# Patient Record
Sex: MALE
[De-identification: names, ages, dates, MRNs, and addresses within clinical notes are randomized per-mention and may not be internally consistent; named-entity substitution may affect disease eponyms.]

---

## 2019-05-01 PROBLEM — Z00.00 ENCOUNTER FOR PREVENTIVE HEALTH EXAMINATION: Status: ACTIVE | Noted: 2019-05-01

## 2019-05-02 ENCOUNTER — APPOINTMENT (OUTPATIENT)
Dept: PHYSICAL MEDICINE AND REHAB | Facility: CLINIC | Age: 64
End: 2019-05-02
Payer: COMMERCIAL

## 2019-05-02 VITALS — HEIGHT: 74 IN | WEIGHT: 240 LBS | BODY MASS INDEX: 30.8 KG/M2

## 2019-05-02 DIAGNOSIS — Z86.39 PERSONAL HISTORY OF OTHER ENDOCRINE, NUTRITIONAL AND METABOLIC DISEASE: ICD-10-CM

## 2019-05-02 DIAGNOSIS — Z80.9 FAMILY HISTORY OF MALIGNANT NEOPLASM, UNSPECIFIED: ICD-10-CM

## 2019-05-02 DIAGNOSIS — M79.18 MYALGIA, OTHER SITE: ICD-10-CM

## 2019-05-02 DIAGNOSIS — M50.20 OTHER CERVICAL DISC DISPLACEMENT, UNSPECIFIED CERVICAL REGION: ICD-10-CM

## 2019-05-02 DIAGNOSIS — Z87.39 PERSONAL HISTORY OF OTHER DISEASES OF THE MUSCULOSKELETAL SYSTEM AND CONNECTIVE TISSUE: ICD-10-CM

## 2019-05-02 DIAGNOSIS — M47.812 SPONDYLOSIS W/OUT MYELOPATHY OR RADICULOPATHY, CERVICAL REGION: ICD-10-CM

## 2019-05-02 DIAGNOSIS — Z86.79 PERSONAL HISTORY OF OTHER DISEASES OF THE CIRCULATORY SYSTEM: ICD-10-CM

## 2019-05-02 PROCEDURE — 99204 OFFICE O/P NEW MOD 45 MIN: CPT | Mod: 25

## 2019-05-02 PROCEDURE — 20552 NJX 1/MLT TRIGGER POINT 1/2: CPT

## 2019-05-02 RX ORDER — SIMVASTATIN 40 MG/1
TABLET, FILM COATED ORAL
Refills: 0 | Status: ACTIVE | COMMUNITY

## 2019-05-02 RX ORDER — ACETAMINOPHEN AND CODEINE PHOSPHATE 300; 30 MG/1; MG/1
300-30 TABLET ORAL
Refills: 0 | Status: ACTIVE | COMMUNITY

## 2019-05-02 RX ORDER — MORPHINE SULFATE 20 MG/1
20 CAPSULE, EXTENDED RELEASE ORAL
Refills: 0 | Status: ACTIVE | COMMUNITY

## 2019-05-02 RX ORDER — MELOXICAM 7.5 MG/1
7.5 TABLET ORAL TWICE DAILY
Qty: 60 | Refills: 0 | Status: ACTIVE | COMMUNITY
Start: 2019-05-02 | End: 1900-01-01

## 2019-05-02 RX ORDER — BUPROPION HYDROCHLORIDE 450 MG/1
450 TABLET, FILM COATED, EXTENDED RELEASE ORAL
Refills: 0 | Status: ACTIVE | COMMUNITY

## 2019-05-02 RX ORDER — SERTRALINE 25 MG/1
TABLET, FILM COATED ORAL
Refills: 0 | Status: ACTIVE | COMMUNITY

## 2019-05-02 RX ORDER — TOPIRAMATE 25 MG
CAPSULE, SPRINKLE ORAL
Refills: 0 | Status: ACTIVE | COMMUNITY

## 2019-05-02 RX ORDER — LEVOTHYROXINE SODIUM 0.17 MG/1
TABLET ORAL
Refills: 0 | Status: ACTIVE | COMMUNITY

## 2019-05-02 NOTE — ASSESSMENT
[FreeTextEntry1] : Will get records from [facility] and Dr Ambriz.  Get new EMG with Dr Ambriz. Just got narcotics refills this wk. \par \par Improve posture.  Consider MBB. \par \par Check with psychiatrist if can take Gabapentin or Lyrica.

## 2019-05-02 NOTE — PHYSICAL EXAM
[FreeTextEntry1] : JER is a 64 year  yr old male \par \par Constitutional: healthy appearing, NAD, and normal body habitus\par \par NECK\par ROM: flexion to 30, ext to 30, rotation to 60 deg on left and 70 on right\par \par Inspection: no erythema, warmth\par Spine: no TTP in spinous process\par Soft tissue palpation: no TTP in cervical paraspinals, rhomboids, TTP in left trapezius\par \par 5/5 bilateral elb flex/ext, WE, finger abd/flex\par sensation intact in bilat UE\par reflexes:  biceps and triceps 0 bilat\par \par Special tests: neg Spurling, Suero\par \par

## 2019-05-02 NOTE — HISTORY OF PRESENT ILLNESS
[FreeTextEntry1] : Location: neck\par Quality: sharp\par Severity: 4/10\par Duration: over 4 yr\par Timing: chronic\par Context: atraumatic\par Aggravating Factors: turning neck\par Alleviating Factors: narcotics, NORM\par Associated Symptoms: denies weight loss, fever, chills, change in bowel/bladder habits, redness, warmth, weakness, +numbness/tingling in hands, no radiation down arm\par Prior Studies:\par

## 2019-05-02 NOTE — PROCEDURE
[de-identified] : After informed consent,he elected to proceed with a trigger point injection into the left trapezius. I confirmed no prior adverse reactions, no active infections, and no relevant allergies. \par  \par The skin was prepped in the usual sterile manner. The muscles were pinched and elevated from the chest wall to avoid puncturing the lung. The sites were injected with local anesthetic followed by local needling. The injection was completed without complication and a bandage was applied. He tolerated the procedure well and was given post-injection instructions. \par  \par Cold Tx x 48 hours, analgesics prn. Medications: 0.5 ml of 1% Lidocaine per site\par \par \par